# Patient Record
Sex: FEMALE | Race: WHITE | Employment: UNEMPLOYED | URBAN - METROPOLITAN AREA
[De-identification: names, ages, dates, MRNs, and addresses within clinical notes are randomized per-mention and may not be internally consistent; named-entity substitution may affect disease eponyms.]

---

## 2020-03-26 ENCOUNTER — HOSPITAL ENCOUNTER (EMERGENCY)
Age: 25
Discharge: PSYCHIATRIC OR SUBSTANCE USE FACILITY | End: 2020-03-27
Payer: MEDICAID

## 2020-03-26 VITALS
RESPIRATION RATE: 18 BRPM | WEIGHT: 124 LBS | TEMPERATURE: 97.8 F | OXYGEN SATURATION: 98 % | BODY MASS INDEX: 22.82 KG/M2 | DIASTOLIC BLOOD PRESSURE: 78 MMHG | HEIGHT: 62 IN | HEART RATE: 90 BPM | SYSTOLIC BLOOD PRESSURE: 147 MMHG

## 2020-03-26 LAB
ACETAMINOPHEN LEVEL: <5 UG/ML (ref 10–30)
ALBUMIN SERPL-MCNC: 4.4 G/DL (ref 3.5–4.6)
ALP BLD-CCNC: 69 U/L (ref 40–130)
ALT SERPL-CCNC: 20 U/L (ref 0–33)
ANION GAP SERPL CALCULATED.3IONS-SCNC: 16 MEQ/L (ref 9–15)
AST SERPL-CCNC: 19 U/L (ref 0–35)
BASOPHILS ABSOLUTE: 0.1 K/UL (ref 0–0.2)
BASOPHILS RELATIVE PERCENT: 0.9 %
BILIRUB SERPL-MCNC: 0.3 MG/DL (ref 0.2–0.7)
BUN BLDV-MCNC: 14 MG/DL (ref 6–20)
CALCIUM SERPL-MCNC: 9.3 MG/DL (ref 8.5–9.9)
CHLORIDE BLD-SCNC: 103 MEQ/L (ref 95–107)
CO2: 19 MEQ/L (ref 20–31)
CREAT SERPL-MCNC: 0.62 MG/DL (ref 0.5–0.9)
EOSINOPHILS ABSOLUTE: 0.1 K/UL (ref 0–0.7)
EOSINOPHILS RELATIVE PERCENT: 1.4 %
ETHANOL PERCENT: NORMAL G/DL
ETHANOL: <10 MG/DL (ref 0–0.08)
GFR AFRICAN AMERICAN: >60
GFR NON-AFRICAN AMERICAN: >60
GLOBULIN: 3.2 G/DL (ref 2.3–3.5)
GLUCOSE BLD-MCNC: 101 MG/DL (ref 70–99)
HCG QUALITATIVE: NEGATIVE
HCT VFR BLD CALC: 33.6 % (ref 37–47)
HEMOGLOBIN: 11.4 G/DL (ref 12–16)
LYMPHOCYTES ABSOLUTE: 2.4 K/UL (ref 1–4.8)
LYMPHOCYTES RELATIVE PERCENT: 39 %
MCH RBC QN AUTO: 29.4 PG (ref 27–31.3)
MCHC RBC AUTO-ENTMCNC: 33.9 % (ref 33–37)
MCV RBC AUTO: 86.8 FL (ref 82–100)
MONOCYTES ABSOLUTE: 0.4 K/UL (ref 0.2–0.8)
MONOCYTES RELATIVE PERCENT: 6.8 %
NEUTROPHILS ABSOLUTE: 3.3 K/UL (ref 1.4–6.5)
NEUTROPHILS RELATIVE PERCENT: 51.9 %
PDW BLD-RTO: 16.1 % (ref 11.5–14.5)
PLATELET # BLD: 286 K/UL (ref 130–400)
POTASSIUM SERPL-SCNC: 3.1 MEQ/L (ref 3.4–4.9)
RBC # BLD: 3.87 M/UL (ref 4.2–5.4)
SALICYLATE, SERUM: <0.3 MG/DL (ref 15–30)
SODIUM BLD-SCNC: 138 MEQ/L (ref 135–144)
TOTAL CK: 153 U/L (ref 0–170)
TOTAL PROTEIN: 7.6 G/DL (ref 6.3–8)
TSH SERPL DL<=0.05 MIU/L-ACNC: 0.39 UIU/ML (ref 0.44–3.86)
WBC # BLD: 6.3 K/UL (ref 4.8–10.8)

## 2020-03-26 PROCEDURE — 82550 ASSAY OF CK (CPK): CPT

## 2020-03-26 PROCEDURE — G0480 DRUG TEST DEF 1-7 CLASSES: HCPCS

## 2020-03-26 PROCEDURE — 87186 SC STD MICRODIL/AGAR DIL: CPT

## 2020-03-26 PROCEDURE — 87086 URINE CULTURE/COLONY COUNT: CPT

## 2020-03-26 PROCEDURE — 80053 COMPREHEN METABOLIC PANEL: CPT

## 2020-03-26 PROCEDURE — 87077 CULTURE AEROBIC IDENTIFY: CPT

## 2020-03-26 PROCEDURE — 84703 CHORIONIC GONADOTROPIN ASSAY: CPT

## 2020-03-26 PROCEDURE — 96372 THER/PROPH/DIAG INJ SC/IM: CPT

## 2020-03-26 PROCEDURE — 36415 COLL VENOUS BLD VENIPUNCTURE: CPT

## 2020-03-26 PROCEDURE — 85025 COMPLETE CBC W/AUTO DIFF WBC: CPT

## 2020-03-26 PROCEDURE — 6360000002 HC RX W HCPCS: Performed by: EMERGENCY MEDICINE

## 2020-03-26 PROCEDURE — 84443 ASSAY THYROID STIM HORMONE: CPT

## 2020-03-26 PROCEDURE — 99285 EMERGENCY DEPT VISIT HI MDM: CPT

## 2020-03-26 PROCEDURE — 80307 DRUG TEST PRSMV CHEM ANLYZR: CPT

## 2020-03-26 PROCEDURE — 81001 URINALYSIS AUTO W/SCOPE: CPT

## 2020-03-26 RX ORDER — LORAZEPAM 2 MG/ML
2 INJECTION INTRAMUSCULAR ONCE
Status: COMPLETED | OUTPATIENT
Start: 2020-03-26 | End: 2020-03-26

## 2020-03-26 RX ORDER — HALOPERIDOL 5 MG/ML
5 INJECTION INTRAMUSCULAR ONCE
Status: COMPLETED | OUTPATIENT
Start: 2020-03-26 | End: 2020-03-26

## 2020-03-26 RX ADMIN — HALOPERIDOL LACTATE 5 MG: 5 INJECTION, SOLUTION INTRAMUSCULAR at 14:08

## 2020-03-26 RX ADMIN — LORAZEPAM 2 MG: 2 INJECTION INTRAMUSCULAR; INTRAVENOUS at 14:08

## 2020-03-26 ASSESSMENT — ENCOUNTER SYMPTOMS
COLOR CHANGE: 0
SHORTNESS OF BREATH: 0
ABDOMINAL DISTENTION: 0
SORE THROAT: 0
VOMITING: 0
CONSTIPATION: 0
EYE DISCHARGE: 0
RHINORRHEA: 0
DIARRHEA: 0
NAUSEA: 0
ABDOMINAL PAIN: 0

## 2020-03-26 NOTE — ED PROVIDER NOTES
arthralgias. Skin: Negative for color change, pallor, rash and wound. Neurological: Negative for dizziness, tremors, syncope, weakness, numbness and headaches. Psychiatric/Behavioral: Positive for agitation, behavioral problems and confusion. Except as noted above the remainder of the review of systems was reviewed and negative. PAST MEDICAL HISTORY     Past Medical History:   Diagnosis Date    Asthma          SURGICALHISTORY       Past Surgical History:   Procedure Laterality Date     SECTION      TONSILLECTOMY  at age 3         CURRENT MEDICATIONS       Discharge Medication List as of 3/27/2020  9:14 AM      CONTINUE these medications which have NOT CHANGED    Details   risperiDONE (RISPERDAL) 0.5 MG tablet Take 0.5 mg by mouth 2 times dailyHistorical Med      propranolol (INDERAL) 10 MG tablet Take 10 mg by mouth 3 times dailyHistorical Med      amitriptyline (ELAVIL) 50 MG tablet Take 50 mg by mouth nightlyHistorical Med      ALPRAZolam (XANAX) 0.5 MG tablet Take 0.5 mg by mouth 2 times daily as needed for Anxiety. Historical Med      albuterol (PROVENTIL HFA;VENTOLIN HFA) 108 (90 BASE) MCG/ACT inhaler Inhale 2 puffs into the lungs every 4 hours as needed for Wheezing or Shortness of Breath., Disp-1 Inhaler, R-5             ALLERGIES     Patient has no known allergies.     FAMILY HISTORY       Family History   Problem Relation Age of Onset    Other Mother         liver disease cirrosis    Cancer Father 40        esophageal    Cancer Maternal Grandmother 61        lung    Other Paternal Grandfather         alzheimer          SOCIAL HISTORY       Social History     Socioeconomic History    Marital status: Single     Spouse name: None    Number of children: None    Years of education: None    Highest education level: None   Occupational History    None   Social Needs    Financial resource strain: None    Food insecurity     Worry: None     Inability: None    Transportation needs     Medical: None     Non-medical: None   Tobacco Use    Smoking status: Current Some Day Smoker     Packs/day: 0.50     Types: Cigarettes    Smokeless tobacco: Never Used   Substance and Sexual Activity    Alcohol use: Yes     Comment: on occasion    Drug use: No    Sexual activity: Yes     Partners: Male   Lifestyle    Physical activity     Days per week: None     Minutes per session: None    Stress: None   Relationships    Social connections     Talks on phone: None     Gets together: None     Attends [employer]ology service: None     Active member of club or organization: None     Attends meetings of clubs or organizations: None     Relationship status: None    Intimate partner violence     Fear of current or ex partner: None     Emotionally abused: None     Physically abused: None     Forced sexual activity: None   Other Topics Concern    None   Social History Narrative    None       SCREENINGS      @FLOW([id number])@      PHYSICAL EXAM    (up to 7 for level 4, 8 or more for level 5)     ED Triage Vitals [03/26/20 1341]   BP Temp Temp Source Pulse Resp SpO2 Height Weight   (!) 110/92 97.8 °F (36.6 °C) Oral 97 22 100 % 5' 2\" (1.575 m) 124 lb (56.2 kg)       Physical Exam  Constitutional:       General: She is not in acute distress. Appearance: She is well-developed. She is not ill-appearing, toxic-appearing or diaphoretic. Comments: Patient is agitated, she would not hold still, she is writhing all over the bed. HENT:      Head: Normocephalic. Right Ear: Tympanic membrane normal.      Left Ear: Tympanic membrane normal.      Nose: Nose normal.   Eyes:      Pupils: Pupils are equal, round, and reactive to light. Neck:      Musculoskeletal: Neck supple. Vascular: No JVD. Trachea: No tracheal deviation. Cardiovascular:      Rate and Rhythm: Normal rate. Pulmonary:      Effort: Pulmonary effort is normal. No respiratory distress. Breath sounds:  No wheezing

## 2020-03-26 NOTE — ED NOTES
[employer] representative done speaking with patient, requesting call if patient is to be discharged and will seek drug treatment placement for patient     Melba Sanz RN  03/26/20 4137

## 2020-03-27 LAB
AMPHETAMINE SCREEN, URINE: POSITIVE
BACTERIA: ABNORMAL /HPF
BARBITURATE SCREEN URINE: ABNORMAL
BENZODIAZEPINE SCREEN, URINE: POSITIVE
BILIRUBIN URINE: NEGATIVE
BLOOD, URINE: NEGATIVE
CANNABINOID SCREEN URINE: POSITIVE
CLARITY: ABNORMAL
COCAINE METABOLITE SCREEN URINE: POSITIVE
COLOR: YELLOW
EPITHELIAL CELLS, UA: ABNORMAL /HPF (ref 0–5)
GLUCOSE URINE: NEGATIVE MG/DL
HYALINE CASTS: ABNORMAL /HPF (ref 0–5)
KETONES, URINE: ABNORMAL MG/DL
LEUKOCYTE ESTERASE, URINE: ABNORMAL
Lab: ABNORMAL
METHADONE SCREEN, URINE: ABNORMAL
NITRITE, URINE: POSITIVE
OPIATE SCREEN URINE: ABNORMAL
OXYCODONE URINE: ABNORMAL
PH UA: 6 (ref 5–9)
PHENCYCLIDINE SCREEN URINE: ABNORMAL
PROPOXYPHENE SCREEN: ABNORMAL
PROTEIN UA: 30 MG/DL
RBC UA: ABNORMAL /HPF (ref 0–5)
SPECIFIC GRAVITY UA: 1.03 (ref 1–1.03)
URINE REFLEX TO CULTURE: YES
UROBILINOGEN, URINE: 1 E.U./DL
WBC UA: >100 /HPF (ref 0–5)

## 2020-03-27 PROCEDURE — 6370000000 HC RX 637 (ALT 250 FOR IP): Performed by: NURSE PRACTITIONER

## 2020-03-27 RX ORDER — ALPRAZOLAM 0.5 MG/1
0.5 TABLET ORAL 2 TIMES DAILY PRN
COMMUNITY

## 2020-03-27 RX ORDER — RISPERIDONE 0.5 MG/1
0.5 TABLET, FILM COATED ORAL 2 TIMES DAILY
COMMUNITY

## 2020-03-27 RX ORDER — PROPRANOLOL HYDROCHLORIDE 10 MG/1
10 TABLET ORAL 3 TIMES DAILY
COMMUNITY

## 2020-03-27 RX ORDER — AMITRIPTYLINE HYDROCHLORIDE 50 MG/1
50 TABLET, FILM COATED ORAL NIGHTLY
COMMUNITY

## 2020-03-27 RX ORDER — SULFAMETHOXAZOLE AND TRIMETHOPRIM 800; 160 MG/1; MG/1
1 TABLET ORAL ONCE
Status: COMPLETED | OUTPATIENT
Start: 2020-03-27 | End: 2020-03-27

## 2020-03-27 RX ADMIN — SULFAMETHOXAZOLE AND TRIMETHOPRIM 1 TABLET: 800; 160 TABLET ORAL at 08:22

## 2020-03-27 NOTE — ED NOTES
Spoke with Ricardo Choe at [facility].  Pt. placed on the list for dual diagnosis placement      Stephany Jimenez RN  03/27/20 4187

## 2020-03-27 NOTE — ED NOTES
Mariela Davis from [facility] called to inquire about the status of pt. She states that the patient will have a bed there, even if she needs to be hospitalized for a brief period of time.       Kt Stinson RN  03/26/20 6272
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
## 2020-03-27 NOTE — ED NOTES
Awaiting transfer to [facility] per report given earlier by [facility]. Up in area with no acute distress noted. Voices no complaints.      Nathalie Cordero RN  03/27/20 7084

## 2020-03-27 NOTE — ED NOTES
Pt. Awakened and encouraged to provide urine specimen so appropriate placement can be sought for her. Unable to urinate at this time, but states she will let staff know when she can.       Homer Alford RN  03/27/20 0926

## 2020-03-29 LAB
ORGANISM: ABNORMAL
URINE CULTURE, ROUTINE: ABNORMAL

## 2020-03-31 ASSESSMENT — ENCOUNTER SYMPTOMS
COLOR CHANGE: 0
SHORTNESS OF BREATH: 0
CONSTIPATION: 0
NAUSEA: 0
EYE DISCHARGE: 0
ABDOMINAL PAIN: 0
RHINORRHEA: 0
DIARRHEA: 0
SORE THROAT: 0
ABDOMINAL DISTENTION: 0
VOMITING: 0

## 2023-04-24 ENCOUNTER — TELEPHONE (OUTPATIENT)
Dept: INTERNAL MEDICINE | Age: 28
End: 2023-04-24

## 2023-05-25 ENCOUNTER — INITIAL PRENATAL (OUTPATIENT)
Dept: OBGYN | Age: 28
End: 2023-05-25

## 2023-05-25 ENCOUNTER — TELEPHONE (OUTPATIENT)
Dept: ADMINISTRATIVE | Age: 28
End: 2023-05-25

## 2023-05-25 ENCOUNTER — HOSPITAL ENCOUNTER (OUTPATIENT)
Age: 28
Discharge: HOME | End: 2023-05-25

## 2023-05-25 VITALS — SYSTOLIC BLOOD PRESSURE: 130 MMHG | HEART RATE: 124 BPM | DIASTOLIC BLOOD PRESSURE: 73 MMHG

## 2023-05-25 DIAGNOSIS — R00.9 ELEVATED HEART RATE WITH ELEVATED BLOOD PRESSURE WITHOUT DIAGNOSIS OF HYPERTENSION: ICD-10-CM

## 2023-05-25 DIAGNOSIS — Z34.81 SUPERVISION OF NORMAL INTRAUTERINE PREGNANCY IN MULTIGRAVIDA IN FIRST TRIMESTER: ICD-10-CM

## 2023-05-25 DIAGNOSIS — R03.0 ELEVATED HEART RATE WITH ELEVATED BLOOD PRESSURE WITHOUT DIAGNOSIS OF HYPERTENSION: ICD-10-CM

## 2023-05-25 DIAGNOSIS — F19.11 HISTORY OF DRUG ABUSE (HCC): ICD-10-CM

## 2023-05-25 DIAGNOSIS — Z87.59 HISTORY OF PLACENTAL ABRUPTION: ICD-10-CM

## 2023-05-25 DIAGNOSIS — Z34.91 PRENATAL CARE IN FIRST TRIMESTER: ICD-10-CM

## 2023-05-25 DIAGNOSIS — Z12.4 SCREENING FOR CERVICAL CANCER: ICD-10-CM

## 2023-05-25 DIAGNOSIS — O98.411 CHRONIC VIRAL HEPATITIS COMPLICATING PREGNANCY IN FIRST TRIMESTER (HCC): ICD-10-CM

## 2023-05-25 DIAGNOSIS — B18.9 CHRONIC VIRAL HEPATITIS COMPLICATING PREGNANCY IN FIRST TRIMESTER (HCC): ICD-10-CM

## 2023-05-25 DIAGNOSIS — Z98.891 HISTORY OF CESAREAN SECTION: ICD-10-CM

## 2023-05-25 DIAGNOSIS — Z34.91 PRENATAL CARE IN FIRST TRIMESTER: Primary | ICD-10-CM

## 2023-05-25 LAB
ABO + RH BLD: NORMAL
ALBUMIN SERPL-MCNC: 4.3 G/DL (ref 3.5–5.2)
ALP SERPL-CCNC: 91 U/L (ref 35–104)
ALT SERPL-CCNC: 57 U/L (ref 0–32)
AMPHET UR QL SCN: NOT DETECTED
ANION GAP SERPL CALCULATED.3IONS-SCNC: 12 MMOL/L (ref 7–16)
AST SERPL-CCNC: 38 U/L (ref 0–31)
BARBITURATES UR QL SCN: NOT DETECTED
BENZODIAZ UR QL SCN: NOT DETECTED
BILIRUB SERPL-MCNC: 0.2 MG/DL (ref 0–1.2)
BLD GP AB SCN SERPL QL: NORMAL
BUN SERPL-MCNC: 13 MG/DL (ref 6–20)
CALCIUM SERPL-MCNC: 9.6 MG/DL (ref 8.6–10.2)
CANNABINOIDS UR QL SCN: NOT DETECTED
CHLORIDE SERPL-SCNC: 103 MMOL/L (ref 98–107)
CO2 SERPL-SCNC: 21 MMOL/L (ref 22–29)
COCAINE UR QL SCN: NOT DETECTED
CONTROL: NORMAL
CREAT SERPL-MCNC: 0.7 MG/DL (ref 0.5–1)
DRUG SCREEN COMMENT UR-IMP: NORMAL
ERYTHROCYTE [DISTWIDTH] IN BLOOD BY AUTOMATED COUNT: 12.1 FL (ref 11.5–15)
FENTANYL SCREEN, URINE: NOT DETECTED
GLUCOSE SERPL-MCNC: 80 MG/DL (ref 74–99)
HCT VFR BLD AUTO: 37.4 % (ref 34–48)
HGB BLD-MCNC: 12.6 G/DL (ref 11.5–15.5)
MCH RBC QN AUTO: 30.1 PG (ref 26–35)
MCHC RBC AUTO-ENTMCNC: 33.7 % (ref 32–34.5)
MCV RBC AUTO: 89.3 FL (ref 80–99.9)
METHADONE UR QL SCN: NOT DETECTED
OPIATES UR QL SCN: NOT DETECTED
OXYCODONE URINE: NOT DETECTED
PCP UR QL SCN: NOT DETECTED
PLATELET # BLD AUTO: 279 E9/L (ref 130–450)
PMV BLD AUTO: 10 FL (ref 7–12)
POTASSIUM SERPL-SCNC: 3.8 MMOL/L (ref 3.5–5)
PREGNANCY TEST URINE, POC: POSITIVE
PROT SERPL-MCNC: 7.7 G/DL (ref 6.4–8.3)
RBC # BLD AUTO: 4.19 E12/L (ref 3.5–5.5)
SODIUM SERPL-SCNC: 136 MMOL/L (ref 132–146)
WBC # BLD: 6.8 E9/L (ref 4.5–11.5)

## 2023-05-25 NOTE — PROGRESS NOTES
New Patient alert and pleasant with complaints of nausea   Here today with FOB  for initial prenatal visit. Charlett Bigger Urine for pregnancy obtained with positive results  Urine for culture and UDS obtained, labeled and sent to lab  Discharge instructions have been discussed with the patient. Patient advised to call our office with any questions or concerns. Voiced understanding. NIPT kit and orders with all blood work orders given to patient and advised to go to lab to have blood work ordered   Pelvic exam, pap smear obtained, labeled  and delivered to lab.
Future  -     Hepatitis C Antibody; Future  -     HIV Screen; Future  -     Varicella Zoster Antibody, IgG; Future  -     Urine Drug Screen; Future  -     Culture, Urine; Future  -     Type and Screen; Future  -     Miscellaneous Sendout; Future  -     Rubella; Future  -     RPR; Future    Supervision of normal intrauterine pregnancy in multigravida in first trimester  -     Panorama Prenatal Test: Chromosomes 13, 18, 21, X & Y: Triploidy 22Q.11.2 Deletion; Future  -     Panorama Prenatal Test: Chromosomes 15, 25, 24, X & Y: Triploidy 22Q.11.2 Deletion    Screening for cervical cancer  -     PAP SMEAR    Chronic viral hepatitis complicating pregnancy in first trimester [facility])  -     Ambulatory referral to Maternal Fetal  -     Comprehensive Metabolic Panel; Future  -     Hepatitis C RNA QNT W Genotype Rflx; Future    History of  section  -     Ambulatory referral to Maternal Fetal    History of drug abuse ([facility] [address]  -     Ambulatory referral to Maternal Fetal    History of placental abruption  -     Ambulatory referral to Maternal Fetal    Elevated heart rate with elevated blood pressure without diagnosis of hypertension  -     [address] [facility]          Return in about 4 weeks (around 2023) for OB visit.      Mat Ha MD

## 2023-05-25 NOTE — PATIENT INSTRUCTIONS
Please call the number below to schedule your imaging we've requested:  [phone], select option #1

## 2023-05-26 LAB
HBV SURFACE AG SERPL QL IA: NORMAL
HCV AB SERPL QL IA: REACTIVE
HIV1+2 AB SERPL QL IA: NORMAL
Lab: NORMAL
RPR SER QL: NORMAL
RUBELLA ANTIBODY IGG: NORMAL
SPECIMEN SOURCE: NORMAL
THIS TEST SENT TO: NORMAL
VARICELLA-ZOSTER VIRUS AB, IGG: NORMAL

## 2023-05-27 LAB — BACTERIA UR CULT: NORMAL

## 2023-05-28 LAB
HCV RNA SERPL NAA+PROBE-ACNC: ABNORMAL IU/ML
HCV RNA SERPL NAA+PROBE-LOG IU: 4.69 LOG IU/ML
HCV RNA SERPL QL NAA+PROBE: DETECTED

## 2023-05-29 ENCOUNTER — APPOINTMENT (OUTPATIENT)
Dept: ULTRASOUND IMAGING | Age: 28
End: 2023-05-29
Payer: MEDICAID

## 2023-05-29 VITALS
TEMPERATURE: 98 F | RESPIRATION RATE: 18 BRPM | HEIGHT: 62 IN | OXYGEN SATURATION: 99 % | DIASTOLIC BLOOD PRESSURE: 97 MMHG | BODY MASS INDEX: 32.02 KG/M2 | HEART RATE: 103 BPM | WEIGHT: 174 LBS | SYSTOLIC BLOOD PRESSURE: 128 MMHG

## 2023-05-29 LAB
ALBUMIN SERPL-MCNC: 4.2 G/DL (ref 3.5–5.2)
ALP SERPL-CCNC: 92 U/L (ref 35–104)
ALT SERPL-CCNC: 54 U/L (ref 0–32)
ANION GAP SERPL CALCULATED.3IONS-SCNC: 11 MMOL/L (ref 7–16)
AST SERPL-CCNC: 37 U/L (ref 0–31)
BASOPHILS # BLD: 0.03 E9/L (ref 0–0.2)
BASOPHILS NFR BLD: 0.4 % (ref 0–2)
BILIRUB SERPL-MCNC: 0.2 MG/DL (ref 0–1.2)
BUN SERPL-MCNC: 13 MG/DL (ref 6–20)
CALCIUM SERPL-MCNC: 9 MG/DL (ref 8.6–10.2)
CHLORIDE SERPL-SCNC: 102 MMOL/L (ref 98–107)
CO2 SERPL-SCNC: 23 MMOL/L (ref 22–29)
CREAT SERPL-MCNC: 0.8 MG/DL (ref 0.5–1)
EOSINOPHIL # BLD: 0.1 E9/L (ref 0.05–0.5)
EOSINOPHIL NFR BLD: 1.2 % (ref 0–6)
ERYTHROCYTE [DISTWIDTH] IN BLOOD BY AUTOMATED COUNT: 12.2 FL (ref 11.5–15)
GLUCOSE SERPL-MCNC: 89 MG/DL (ref 74–99)
GONADOTROPIN, CHORIONIC (HCG) QUANT: 4011 MIU/ML
HCT VFR BLD AUTO: 36.2 % (ref 34–48)
HGB BLD-MCNC: 12.3 G/DL (ref 11.5–15.5)
IMM GRANULOCYTES # BLD: 0.02 E9/L
IMM GRANULOCYTES NFR BLD: 0.2 % (ref 0–5)
LYMPHOCYTES # BLD: 2.9 E9/L (ref 1.5–4)
LYMPHOCYTES NFR BLD: 33.8 % (ref 20–42)
MCH RBC QN AUTO: 30.7 PG (ref 26–35)
MCHC RBC AUTO-ENTMCNC: 34 % (ref 32–34.5)
MCV RBC AUTO: 90.3 FL (ref 80–99.9)
MONOCYTES # BLD: 0.57 E9/L (ref 0.1–0.95)
MONOCYTES NFR BLD: 6.7 % (ref 2–12)
NEUTROPHILS # BLD: 4.95 E9/L (ref 1.8–7.3)
NEUTS SEG NFR BLD: 57.7 % (ref 43–80)
PLATELET # BLD AUTO: 278 E9/L (ref 130–450)
PMV BLD AUTO: 9.2 FL (ref 7–12)
POTASSIUM SERPL-SCNC: 3.3 MMOL/L (ref 3.5–5)
PROT SERPL-MCNC: 7.5 G/DL (ref 6.4–8.3)
RBC # BLD AUTO: 4.01 E12/L (ref 3.5–5.5)
SODIUM SERPL-SCNC: 136 MMOL/L (ref 132–146)
WBC # BLD: 8.6 E9/L (ref 4.5–11.5)

## 2023-05-29 PROCEDURE — 86901 BLOOD TYPING SEROLOGIC RH(D): CPT

## 2023-05-29 PROCEDURE — 76817 TRANSVAGINAL US OBSTETRIC: CPT

## 2023-05-29 PROCEDURE — 85025 COMPLETE CBC W/AUTO DIFF WBC: CPT

## 2023-05-29 PROCEDURE — 80053 COMPREHEN METABOLIC PANEL: CPT

## 2023-05-29 PROCEDURE — 36415 COLL VENOUS BLD VENIPUNCTURE: CPT

## 2023-05-29 PROCEDURE — 84702 CHORIONIC GONADOTROPIN TEST: CPT

## 2023-05-29 PROCEDURE — 99284 EMERGENCY DEPT VISIT MOD MDM: CPT

## 2023-05-29 PROCEDURE — 86900 BLOOD TYPING SEROLOGIC ABO: CPT

## 2023-05-29 RX ORDER — ACETAMINOPHEN 325 MG/1
650 TABLET ORAL ONCE
Status: DISCONTINUED | OUTPATIENT
Start: 2023-05-29 | End: 2023-05-30 | Stop reason: HOSPADM

## 2023-05-29 ASSESSMENT — PAIN DESCRIPTION - FREQUENCY: FREQUENCY: CONTINUOUS

## 2023-05-29 ASSESSMENT — PAIN SCALES - GENERAL: PAINLEVEL_OUTOF10: 4

## 2023-05-29 ASSESSMENT — PAIN DESCRIPTION - PAIN TYPE: TYPE: ACUTE PAIN

## 2023-05-29 ASSESSMENT — PAIN DESCRIPTION - ONSET: ONSET: ON-GOING

## 2023-05-29 ASSESSMENT — PAIN DESCRIPTION - DESCRIPTORS: DESCRIPTORS: CRAMPING

## 2023-05-29 ASSESSMENT — PAIN - FUNCTIONAL ASSESSMENT: PAIN_FUNCTIONAL_ASSESSMENT: 0-10

## 2023-05-29 ASSESSMENT — PAIN DESCRIPTION - LOCATION: LOCATION: ABDOMEN

## 2023-05-29 ASSESSMENT — PAIN DESCRIPTION - ORIENTATION: ORIENTATION: LOWER

## 2023-05-30 ENCOUNTER — HOSPITAL ENCOUNTER (EMERGENCY)
Age: 28
Discharge: HOME | End: 2023-05-30
Payer: MEDICAID

## 2023-05-30 DIAGNOSIS — O28.3 ABNORMAL PREGNANCY US: ICD-10-CM

## 2023-05-30 DIAGNOSIS — O20.0 THREATENED MISCARRIAGE: Primary | ICD-10-CM

## 2023-05-30 LAB
ABO + RH BLD: NORMAL
C TRACH DNA SPEC QL NAA+PROBE: NEGATIVE
N GONORRHOEA DNA SPEC QL NAA+PROBE: NEGATIVE
SPECIMEN SOURCE: NORMAL

## 2023-05-30 NOTE — ED PROVIDER NOTES
.  Independent           HPI:  23, Time: 10:04 PM EDT         Leilani Schilling is a 25 y.o. female presenting to the ED for bleeding in the setting of pregnancy. Patient reports she is approximately 11 weeks OB being followed by OB/GYN Dr. Paramjit Simon. She reports that last night she started having some spotting and this continued into today. States while she was at work she passed a clot. She does report having back pain as well as abdominal pain and cramping. She reports she did not yet have an ultrasound to evaluate an intrauterine pregnancy. Patient reports she is not needing to wear a pad right now but does have blood noted on her toilet paper every time she goes to the bathroom. Patient is a  4 para 2 with 1 miscarriage at 15 weeks. Patient otherwise reports normal state health denies any burning with urination, no noted chest pain or shortness of breath as well as no noted fevers, nausea, vomiting or diarrhea. Review of Systems:   A complete review of systems was performed and pertinent positives and negatives are stated within HPI, all other systems reviewed and are negative.          --------------------------------------------- PAST HISTORY ---------------------------------------------  Past Medical History:  has a past medical history of Asthma, Hepatitis C, and History of drug use. Past Surgical History:  has a past surgical history that includes Tonsillectomy (at age 3) and  section (). Social History:  reports that she has quit smoking. Her smoking use included cigarettes. She smoked an average of .5 packs per day. She has never used smokeless tobacco. She reports that she does not currently use alcohol. She reports that she does not currently use drugs. Family History: family history includes Cancer (age of onset: 40) in her father; Cancer (age of onset: 61) in her maternal grandmother; Other in her mother and paternal grandfather.      The patients home

## 2023-05-30 NOTE — DISCHARGE INSTRUCTIONS
Today The US was concerning, it did not show any cardiac Activity , please call your OB GYN in AM to be seen ASAP  Please perform pelvic rest.  If you develop any increase in bleeding, more than 1 pad per hour or severe abdominal pain please return back to the ER.

## 2023-05-30 NOTE — ED NOTES
Patient unable to provide urine specimen at this time, specimen cup provided.       Suresh Dennis, [place]  [birth year]/33 4777

## 2023-05-31 LAB
Lab: NORMAL
REPORT: NORMAL
THIS TEST SENT TO: NORMAL

## 2023-06-01 LAB
CFTR MUT ANL BLD/T: NORMAL
CYSTIC FIBROSIS 165 VARIANTS INTERP: NORMAL
CYSTIC FIBROSIS ALLELE 1: NEGATIVE
CYSTIC FIBROSIS ALLELE 2: NEGATIVE
HCV GENTYP SERPL NAA+PROBE: NORMAL

## 2023-06-02 LAB
INTERPRETATION: NORMAL
Lab: ABNORMAL
NTRA 22Q11.2 DELETION SYNDROME POPULATION-BASED RISK TEXT: ABNORMAL
NTRA 22Q11.2 DELETION SYNDROME RESULT TEXT: ABNORMAL
NTRA 22Q11.2 DELETION SYNDROME RISK SCORE TEXT: ABNORMAL
NTRA FETAL FRACTION: ABNORMAL
NTRA GENDER OF FETUS: ABNORMAL
NTRA MONOSOMY X AGE-BASED RISK TEXT: ABNORMAL
NTRA MONOSOMY X RESULT TEXT: ABNORMAL
NTRA MONOSOMY X RISK SCORE TEXT: ABNORMAL
NTRA TRIPLOIDY RESULT TEXT: ABNORMAL
NTRA TRISOMY 13 AGE-BASED RISK TEXT: ABNORMAL
NTRA TRISOMY 13 RESULT TEXT: ABNORMAL
NTRA TRISOMY 13 RISK SCORE TEXT: ABNORMAL
NTRA TRISOMY 18 AGE-BASED RISK TEXT: ABNORMAL
NTRA TRISOMY 18 RESULT TEXT: ABNORMAL
NTRA TRISOMY 18 RISK SCORE TEXT: ABNORMAL
NTRA TRISOMY 21 AGE-BASED RISK TEXT: ABNORMAL
NTRA TRISOMY 21 RESULT TEXT: ABNORMAL
NTRA TRISOMY 21 RISK SCORE TEXT: ABNORMAL
SMA COPY NUMBER, LINKED VARIANT: NORMAL
SMA COPY NUMBER, SMN1 COPIES: NORMAL
SMA COPY NUMBER, SMN2 COPIES: NORMAL
SPECIMEN SOURCE: NORMAL

## 2023-06-06 ENCOUNTER — OFFICE VISIT (OUTPATIENT)
Dept: OBGYN | Age: 28
End: 2023-06-06
Payer: MEDICAID

## 2023-06-06 VITALS
DIASTOLIC BLOOD PRESSURE: 70 MMHG | BODY MASS INDEX: 32.39 KG/M2 | HEART RATE: 113 BPM | WEIGHT: 176 LBS | HEIGHT: 62 IN | SYSTOLIC BLOOD PRESSURE: 122 MMHG

## 2023-06-06 DIAGNOSIS — O03.9 SPONTANEOUS MISCARRIAGE: Primary | ICD-10-CM

## 2023-06-06 PROCEDURE — G8417 CALC BMI ABV UP PARAM F/U: HCPCS | Performed by: OBSTETRICS & GYNECOLOGY

## 2023-06-06 PROCEDURE — G8427 DOCREV CUR MEDS BY ELIG CLIN: HCPCS | Performed by: OBSTETRICS & GYNECOLOGY

## 2023-06-06 PROCEDURE — 99213 OFFICE O/P EST LOW 20 MIN: CPT | Performed by: OBSTETRICS & GYNECOLOGY

## 2023-06-06 PROCEDURE — 1036F TOBACCO NON-USER: CPT | Performed by: OBSTETRICS & GYNECOLOGY

## 2023-06-06 SDOH — ECONOMIC STABILITY: FOOD INSECURITY: WITHIN THE PAST 12 MONTHS, THE FOOD YOU BOUGHT JUST DIDN'T LAST AND YOU DIDN'T HAVE MONEY TO GET MORE.: NEVER TRUE

## 2023-06-06 SDOH — ECONOMIC STABILITY: TRANSPORTATION INSECURITY
IN THE PAST 12 MONTHS, HAS LACK OF TRANSPORTATION KEPT YOU FROM MEETINGS, WORK, OR FROM GETTING THINGS NEEDED FOR DAILY LIVING?: NO

## 2023-06-06 SDOH — ECONOMIC STABILITY: INCOME INSECURITY: HOW HARD IS IT FOR YOU TO PAY FOR THE VERY BASICS LIKE FOOD, HOUSING, MEDICAL CARE, AND HEATING?: NOT VERY HARD

## 2023-06-06 SDOH — ECONOMIC STABILITY: HOUSING INSECURITY
IN THE LAST 12 MONTHS, WAS THERE A TIME WHEN YOU DID NOT HAVE A STEADY PLACE TO SLEEP OR SLEPT IN A SHELTER (INCLUDING NOW)?: NO

## 2023-06-06 SDOH — ECONOMIC STABILITY: FOOD INSECURITY: WITHIN THE PAST 12 MONTHS, YOU WORRIED THAT YOUR FOOD WOULD RUN OUT BEFORE YOU GOT MONEY TO BUY MORE.: PATIENT DECLINED

## 2023-06-06 ASSESSMENT — PATIENT HEALTH QUESTIONNAIRE - PHQ9
SUM OF ALL RESPONSES TO PHQ9 QUESTIONS 1 & 2: 0
SUM OF ALL RESPONSES TO PHQ QUESTIONS 1-9: 0
1. LITTLE INTEREST OR PLEASURE IN DOING THINGS: 0
2. FEELING DOWN, DEPRESSED OR HOPELESS: 0
2. FEELING DOWN, DEPRESSED OR HOPELESS: NOT AT ALL
SUM OF ALL RESPONSES TO PHQ QUESTIONS 1-9: 0
1. LITTLE INTEREST OR PLEASURE IN DOING THINGS: NOT AT ALL
SUM OF ALL RESPONSES TO PHQ9 QUESTIONS 1 & 2: 0

## 2023-06-06 NOTE — PROGRESS NOTES
Patient alert and pleasant. Here today for follow up miscarriage. Having some bleeding and cramping today. Discharge instructions have been discussed with the patient. Patient advised to call our office with any questions or concerns. Voiced understanding.

## 2023-06-06 NOTE — PROGRESS NOTES
Melissa Waggoner     Patient presents for follow up for miscarriage. Patient went to the ED 23 with complaints of bleeding. Pelvic ultrasound showed 8 week fetus without cardiac activity. Since then she has passed tissue. States she has light bleeding today. NIPT showed monosomy X. Risks reviewed with patient. Discussed there is nothing that could have been done to prevent this. Patient is not actively trying to conceive. Patient is Rh positive.      Past Medical History:   Diagnosis Date    Asthma     Hepatitis C     History of drug use         Past Surgical History:   Procedure Laterality Date     SECTION      TONSILLECTOMY  at age 3        Family History   Problem Relation Age of Onset    Other Mother         liver disease cirrosis    Cancer Father 40        esophageal    Cancer Maternal Grandmother 61        lung    Other Paternal Grandfather         alzheimer        Social History       Tobacco History       Smoking Status  Former Smoking Frequency  0.50 packs/day Smoking Tobacco Type  Cigarettes      Smokeless Tobacco Use  Never              Alcohol History       Alcohol Use Status  Not Currently Comment  on occasion              Drug Use       Drug Use Status  Not Currently Comment  recovery 2022              Sexual Activity       Sexually Active  Yes Partners  Male                      Current Outpatient Medications:     amitriptyline (ELAVIL) 50 MG tablet, Take 1 tablet by mouth nightly, Disp: , Rfl:     albuterol (PROVENTIL HFA;VENTOLIN HFA) 108 (90 BASE) MCG/ACT inhaler, Inhale 2 puffs into the lungs every 4 hours as needed for Wheezing or Shortness of Breath., Disp: 1 Inhaler, Rfl: 5    risperiDONE (RISPERDAL) 0.5 MG tablet, Take 0.5 mg by mouth 2 times daily (Patient not taking: Reported on 2023), Disp: , Rfl:     propranolol (INDERAL) 10 MG tablet, Take 10 mg by mouth 3 times daily (Patient not taking: Reported on 2023), Disp: , Rfl:     ALPRAZolam

## 2023-08-31 ENCOUNTER — TRANSCRIBE ORDERS (OUTPATIENT)
Dept: ADMINISTRATIVE | Age: 28
End: 2023-08-31

## 2023-08-31 DIAGNOSIS — N93.9 VAGINA BLEEDING: Primary | ICD-10-CM

## 2023-08-31 DIAGNOSIS — Z87.59: ICD-10-CM

## 2023-09-12 ENCOUNTER — OFFICE VISIT (OUTPATIENT)
Dept: OBGYN | Age: 28
End: 2023-09-12
Payer: MEDICAID

## 2023-09-12 VITALS
HEART RATE: 72 BPM | SYSTOLIC BLOOD PRESSURE: 123 MMHG | WEIGHT: 171 LBS | DIASTOLIC BLOOD PRESSURE: 83 MMHG | HEIGHT: 62 IN | BODY MASS INDEX: 31.47 KG/M2

## 2023-09-12 DIAGNOSIS — O03.9 SPONTANEOUS MISCARRIAGE: ICD-10-CM

## 2023-09-12 DIAGNOSIS — N93.9 ABNORMAL UTERINE BLEEDING: Primary | ICD-10-CM

## 2023-09-12 LAB — HCG QUANTITATIVE: <0.1 MIU/ML (ref 0–7)

## 2023-09-12 PROCEDURE — 36415 COLL VENOUS BLD VENIPUNCTURE: CPT | Performed by: OBSTETRICS & GYNECOLOGY

## 2023-09-12 PROCEDURE — G8427 DOCREV CUR MEDS BY ELIG CLIN: HCPCS | Performed by: OBSTETRICS & GYNECOLOGY

## 2023-09-12 PROCEDURE — 1036F TOBACCO NON-USER: CPT | Performed by: OBSTETRICS & GYNECOLOGY

## 2023-09-12 PROCEDURE — G8417 CALC BMI ABV UP PARAM F/U: HCPCS | Performed by: OBSTETRICS & GYNECOLOGY

## 2023-09-12 PROCEDURE — 99213 OFFICE O/P EST LOW 20 MIN: CPT | Performed by: OBSTETRICS & GYNECOLOGY

## 2023-09-12 RX ORDER — GLECAPREVIR AND PIBRENTASVIR 40; 100 MG/1; MG/1
TABLET, FILM COATED ORAL
COMMUNITY
Start: 2023-08-10

## 2023-09-12 NOTE — PATIENT INSTRUCTIONS
Please call the number below to schedule your imaging we've requested:  [phone], select option #1

## 2023-09-12 NOTE — PROGRESS NOTES
Bobbi Medellin     Patient presents for miscarriage follow up. Patient had a spontaneous miscarriage on . She was seen in the office that week. She was advised to have HCG levels followed to zero but she never had them drawn. She has had bleeding off and on since then. It can be heavy for up to a week with spotting in between. She was supposed to have a pelvic ultrasound on 23 but did not have it drawn. Patient is Rh positive.      Past Medical History:   Diagnosis Date    Asthma     Hepatitis C     History of drug use         Past Surgical History:   Procedure Laterality Date     SECTION      TONSILLECTOMY  at age 3        Family History   Problem Relation Age of Onset    Other Mother         liver disease cirrosis    Cancer Father 40        esophageal    Cancer Maternal Grandmother 61        lung    Other Paternal Grandfather         alzheimer        Social History       Tobacco History       Smoking Status  Former Smoking Frequency  0.50 packs/day Smoking Tobacco Type  Cigarettes      Smokeless Tobacco Use  Never              Alcohol History       Alcohol Use Status  Not Currently Comment  on occasion              Drug Use       Drug Use Status  Not Currently Comment  recovery 2022              Sexual Activity       Sexually Active  Yes Partners  Male                      Current Outpatient Medications:     amitriptyline (ELAVIL) 50 MG tablet, Take 1 tablet by mouth nightly, Disp: , Rfl:     albuterol (PROVENTIL HFA;VENTOLIN HFA) 108 (90 BASE) MCG/ACT inhaler, Inhale 2 puffs into the lungs every 4 hours as needed for Wheezing or Shortness of Breath., Disp: 1 Inhaler, Rfl: 5    MAVYRET 100-40 MG TABS tablet, TAKE 3 TABLETS BY MOUTH AT BREAKFAST FOR 8 WEEKS (Patient not taking: Reported on 2023), Disp: , Rfl:     risperiDONE (RISPERDAL) 0.5 MG tablet, Take 0.5 mg by mouth 2 times daily (Patient not taking: Reported on 2023), Disp: , Rfl:     propranolol (INDERAL) 10

## 2023-09-12 NOTE — PROGRESS NOTES
Patient alert and pleasant with concerns about continued bleeding after miscarriage  Blood work obtained, labled and sent to lab   Discharge instructions have been discussed with the patient. Patient advised to call our office with any questions or concerns. Voiced understanding.

## 2023-09-18 ENCOUNTER — TELEPHONE (OUTPATIENT)
Dept: OBGYN | Age: 28
End: 2023-09-18

## 2023-09-18 NOTE — TELEPHONE ENCOUNTER
Patient notified of HCG results. Patient advised to call and schedule US as soon as possible.   One the US is completed doctor will go over results at next scheduled visit   Patient voiced understanding

## 2023-12-09 ENCOUNTER — HOSPITAL ENCOUNTER (EMERGENCY)
Age: 28
Discharge: HOME | End: 2023-12-09
Payer: MEDICAID

## 2023-12-09 ENCOUNTER — APPOINTMENT (OUTPATIENT)
Dept: GENERAL RADIOLOGY | Age: 28
End: 2023-12-09
Payer: MEDICAID

## 2023-12-09 VITALS
TEMPERATURE: 98.6 F | HEART RATE: 107 BPM | DIASTOLIC BLOOD PRESSURE: 79 MMHG | SYSTOLIC BLOOD PRESSURE: 129 MMHG | RESPIRATION RATE: 16 BRPM | OXYGEN SATURATION: 100 %

## 2023-12-09 DIAGNOSIS — M25.551 RIGHT HIP PAIN: Primary | ICD-10-CM

## 2023-12-09 LAB
BILIRUB UR QL STRIP: NEGATIVE
CLARITY UR: CLEAR
COLOR UR: YELLOW
GLUCOSE UR STRIP-MCNC: NEGATIVE MG/DL
HCG, URINE, POC: NEGATIVE
HGB UR QL STRIP.AUTO: NEGATIVE
KETONES UR STRIP-MCNC: NEGATIVE MG/DL
LEUKOCYTE ESTERASE UR QL STRIP: NEGATIVE
Lab: NORMAL
NEGATIVE QC PASS/FAIL: NORMAL
NITRITE UR QL STRIP: NEGATIVE
PH UR STRIP: 6 [PH] (ref 5–9)
POSITIVE QC PASS/FAIL: NORMAL
PROT UR STRIP-MCNC: NEGATIVE MG/DL
RBC #/AREA URNS HPF: NORMAL /HPF
SP GR UR STRIP: 1.01 (ref 1–1.03)
UROBILINOGEN UR STRIP-ACNC: 0.2 EU/DL (ref 0–1)
WBC #/AREA URNS HPF: NORMAL /HPF

## 2023-12-09 PROCEDURE — 6360000002 HC RX W HCPCS: Performed by: PHYSICIAN ASSISTANT

## 2023-12-09 PROCEDURE — 73502 X-RAY EXAM HIP UNI 2-3 VIEWS: CPT

## 2023-12-09 PROCEDURE — 6370000000 HC RX 637 (ALT 250 FOR IP): Performed by: PHYSICIAN ASSISTANT

## 2023-12-09 PROCEDURE — 81001 URINALYSIS AUTO W/SCOPE: CPT

## 2023-12-09 PROCEDURE — 96372 THER/PROPH/DIAG INJ SC/IM: CPT

## 2023-12-09 PROCEDURE — 99284 EMERGENCY DEPT VISIT MOD MDM: CPT

## 2023-12-09 RX ORDER — METHYLPREDNISOLONE 4 MG/1
TABLET ORAL
Qty: 21 TABLET | Refills: 0 | Status: SHIPPED | OUTPATIENT
Start: 2023-12-09 | End: 2023-12-15

## 2023-12-09 RX ORDER — IBUPROFEN 600 MG/1
600 TABLET ORAL EVERY 6 HOURS PRN
Qty: 20 TABLET | Refills: 0 | Status: SHIPPED | OUTPATIENT
Start: 2023-12-09 | End: 2023-12-14

## 2023-12-09 RX ORDER — PREDNISONE 20 MG/1
40 TABLET ORAL ONCE
Status: COMPLETED | OUTPATIENT
Start: 2023-12-09 | End: 2023-12-09

## 2023-12-09 RX ORDER — KETOROLAC TROMETHAMINE 30 MG/ML
30 INJECTION, SOLUTION INTRAMUSCULAR; INTRAVENOUS ONCE
Status: COMPLETED | OUTPATIENT
Start: 2023-12-09 | End: 2023-12-09

## 2023-12-09 RX ADMIN — KETOROLAC TROMETHAMINE 30 MG: 30 INJECTION, SOLUTION INTRAMUSCULAR; INTRAVENOUS at 21:38

## 2023-12-09 RX ADMIN — PREDNISONE 40 MG: 20 TABLET ORAL at 21:39

## 2023-12-09 ASSESSMENT — PAIN DESCRIPTION - ORIENTATION
ORIENTATION: RIGHT
ORIENTATION: RIGHT

## 2023-12-09 ASSESSMENT — PAIN SCALES - GENERAL
PAINLEVEL_OUTOF10: 8
PAINLEVEL_OUTOF10: 7

## 2023-12-09 ASSESSMENT — PAIN DESCRIPTION - DESCRIPTORS
DESCRIPTORS: ACHING
DESCRIPTORS: DISCOMFORT

## 2023-12-09 ASSESSMENT — PAIN DESCRIPTION - LOCATION
LOCATION: HIP
LOCATION: HIP

## 2023-12-09 ASSESSMENT — PAIN - FUNCTIONAL ASSESSMENT: PAIN_FUNCTIONAL_ASSESSMENT: 0-10

## 2023-12-10 NOTE — ED PROVIDER NOTES
Independent АНДРЕЙ Visit. [facility]   ED  Encounter Note  Admit Date/RoomTime: 2023  9:06 PM  ED Room: 36/36    NAME: Jovanny Collado  : 1995  MRN: [id number]     Chief Complaint:  Hip Pain (right hip pain x 2 days after sleeping wrong, pt states unable to ambulate today)    History of Present Illness       Jovanny Collado is a 25 y.o. old female who presents to the emergency department by private vehicle, for non-traumatic left hip pain which occured 2 day(s) prior to arrival.  The pain was result of sleeping in same position on her side without moving all night. Since onset the symptoms have been persistent. Patient has no prior history of pain/injury with regards to today's visit. Her pain is aggraveated by weight bearing, bending, or walking, relieved by rest of injured area and associated with trouble weight bearing. She denies any abdominal pain, back pain, extremity injury, numbness, weakness, fever, chills, or dysuria, hematuria. Pt reports she has gone to work the past 2 days with the pain, she is a  at [employer] ,and today the pain is worse. Tetanus Status: up to date. ROS   Pertinent positives and negatives are stated within HPI, all other systems reviewed and are negative. Past Medical History:  has a past medical history of Asthma, Hepatitis C, and History of drug use. Surgical History:  has a past surgical history that includes Tonsillectomy (at age 3) and  section (). Social History:  reports that she has quit smoking. Her smoking use included cigarettes. She smoked an average of .5 packs per day. She has never used smokeless tobacco. She reports that she does not currently use alcohol. She reports that she does not currently use drugs. Family History: family history includes Cancer (age of onset: 40) in her father; Cancer (age of onset: 61) in her maternal grandmother;  Other in her

## 2025-04-01 ENCOUNTER — HOSPITAL ENCOUNTER (EMERGENCY)
Age: 30
Discharge: HOME | End: 2025-04-01

## 2025-04-01 ENCOUNTER — APPOINTMENT (OUTPATIENT)
Dept: GENERAL RADIOLOGY | Age: 30
End: 2025-04-01

## 2025-04-01 VITALS
RESPIRATION RATE: 16 BRPM | DIASTOLIC BLOOD PRESSURE: 95 MMHG | TEMPERATURE: 98.5 F | WEIGHT: 160 LBS | HEART RATE: 99 BPM | BODY MASS INDEX: 29.26 KG/M2 | OXYGEN SATURATION: 99 % | SYSTOLIC BLOOD PRESSURE: 150 MMHG

## 2025-04-01 DIAGNOSIS — S46.911A STRAIN OF RIGHT SHOULDER, INITIAL ENCOUNTER: ICD-10-CM

## 2025-04-01 DIAGNOSIS — M25.511 ACUTE PAIN OF RIGHT SHOULDER: Primary | ICD-10-CM

## 2025-04-01 PROCEDURE — 99283 EMERGENCY DEPT VISIT LOW MDM: CPT

## 2025-04-01 PROCEDURE — 73030 X-RAY EXAM OF SHOULDER: CPT

## 2025-04-01 RX ORDER — KETOROLAC TROMETHAMINE 10 MG/1
10 TABLET, FILM COATED ORAL EVERY 6 HOURS PRN
Qty: 20 TABLET | Refills: 0 | Status: SHIPPED | OUTPATIENT
Start: 2025-04-01 | End: 2025-04-06

## 2025-04-01 RX ORDER — ACETAMINOPHEN 325 MG/1
650 TABLET ORAL ONCE
Status: DISCONTINUED | OUTPATIENT
Start: 2025-04-01 | End: 2025-04-01

## 2025-04-01 RX ORDER — CYCLOBENZAPRINE HCL 10 MG
10 TABLET ORAL 3 TIMES DAILY PRN
Qty: 21 TABLET | Refills: 0 | Status: SHIPPED | OUTPATIENT
Start: 2025-04-01 | End: 2025-04-11

## 2025-04-01 RX ORDER — IBUPROFEN 800 MG/1
800 TABLET, FILM COATED ORAL ONCE
Status: DISCONTINUED | OUTPATIENT
Start: 2025-04-01 | End: 2025-04-01

## 2025-04-01 NOTE — DISCHARGE INSTRUCTIONS
XR SHOULDER RIGHT (MIN 2 VIEWS)   Final Result   No acute abnormality.         Take medications that were sent to your pharmacy as prescribed.  Read attached discharge education on shoulder sprain and exercises.  If you develop any new or concerning symptoms, return to the emergency department for reevaluation.

## 2025-04-01 NOTE — ED PROVIDER NOTES
Independent АНДРЕЙ Visit.      [facility]  Department of Emergency Medicine   ED  Encounter Note  Admit Date/RoomTime: 2025  3:10 PM  ED Room: PR3/PR3    NAME: Lyric Dias  : 1995  MRN: [id number]     Chief Complaint:  Shoulder Pain (Patient states she hurt her shoulder 1 week ago and was seen at urgent care , states she is still having pain)    History of Present Illness       Lyric Dias is a 29 y.o. old female who presents to the emergency department by private vehicle, for right shoulder pain that began 1 week ago.  When asked about what caused the pain, patient states \"I was doing stuff that I should not be doing.\"  She is right-handed.  Denies any previous injuries or surgeries to affected shoulder.  She has pain from anterior aspect of right shoulder into clavicle.  Also having trapezius pain.  Denies chest pain, shortness of breath, lightheadedness, dizziness, weakness.  On assessment, she is in no acute distress, nontoxic-appearing, respirations are easy and unlabored.  GCS is 15.     ROS   Pertinent positives and negatives are stated within HPI, all other systems reviewed and are negative.    Past Medical History:  has a past medical history of Asthma, Hepatitis C, and History of drug use.    Surgical History:  has a past surgical history that includes Tonsillectomy (at age 7) and  section ().    Social History:  reports that she has quit smoking. Her smoking use included cigarettes. She has never used smokeless tobacco. She reports that she does not currently use alcohol. She reports that she does not currently use drugs.    Family History: family history includes Cancer (age of onset: 44) in her father; Cancer (age of onset: 63) in her maternal grandmother; Other in her mother and paternal grandfather.     Allergies: Morphine and Penicillin g    Physical Exam   Oxygen Saturation Interpretation: Normal.        ED Triage Vitals   BP Systolic BP

## 2025-06-06 ENCOUNTER — HOSPITAL ENCOUNTER (OUTPATIENT)
Dept: GENERAL RADIOLOGY | Age: 30
Discharge: HOME | End: 2025-06-08
Payer: COMMERCIAL

## 2025-06-06 DIAGNOSIS — R52 PAIN: ICD-10-CM

## 2025-06-06 PROCEDURE — 73562 X-RAY EXAM OF KNEE 3: CPT
